# Patient Record
Sex: MALE | Employment: OTHER | URBAN - METROPOLITAN AREA
[De-identification: names, ages, dates, MRNs, and addresses within clinical notes are randomized per-mention and may not be internally consistent; named-entity substitution may affect disease eponyms.]

---

## 2019-02-03 ENCOUNTER — HOSPITAL ENCOUNTER (EMERGENCY)
Facility: HOSPITAL | Age: 64
Discharge: LEFT BEFORE DISCHARGE | End: 2019-02-03
Attending: EMERGENCY MEDICINE
Payer: MEDICARE

## 2019-02-03 VITALS
RESPIRATION RATE: 16 BRPM | DIASTOLIC BLOOD PRESSURE: 65 MMHG | WEIGHT: 164 LBS | HEIGHT: 69 IN | HEART RATE: 84 BPM | OXYGEN SATURATION: 99 % | TEMPERATURE: 99 F | BODY MASS INDEX: 24.29 KG/M2 | SYSTOLIC BLOOD PRESSURE: 131 MMHG

## 2019-02-03 DIAGNOSIS — R56.9 SEIZURE: Primary | ICD-10-CM

## 2019-02-03 PROCEDURE — 99281 EMR DPT VST MAYX REQ PHY/QHP: CPT

## 2019-02-03 NOTE — ED PROVIDER NOTES
"Encounter Date: 2/3/2019    SCRIBE #1 NOTE: I, Winter Molina, am scribing for, and in the presence of,  Dr. Sherwood. I have scribed the entire note.       History     Chief Complaint   Patient presents with    Seizures     denies hx of seizure, [place] by security at airport. states pt "slumped to floor and began to have fish type movement on floor". Pt does not remember episode. Denies trauma.      Robinson Naik is a 63 y.o. male who  has no past medical history on file.     The patient presents to the ED via EMS due to a witnessed seizure like activity at the airport by airport security PTA. Per EMS, airport security reports patient was "slumped to floor and began to have fish type movement on floor." Patient denies any memory of the event. He reports of pain to the back of his head. Has no other complaint at this time.  He reports of no Hx of seizures. The patient denies any EtOH use or drug use. The patient is requesting to leave AMA.      The history is provided by the patient and the EMS personnel.     Review of patient's allergies indicates:  No Known Allergies  No past medical history on file.  No past surgical history on file.  No family history on file.  Social History     Tobacco Use    Smoking status: Not on file   Substance Use Topics    Alcohol use: Not on file    Drug use: Not on file     Review of Systems   Constitutional: Negative for chills and fever.   HENT: Negative for congestion, ear pain, rhinorrhea and sore throat.         (+) pain to back of head   Respiratory: Negative for cough, shortness of breath and wheezing.    Cardiovascular: Negative for chest pain and palpitations.   Gastrointestinal: Negative for abdominal pain, diarrhea, nausea and vomiting.   Genitourinary: Negative for dysuria and hematuria.   Musculoskeletal: Negative for back pain, myalgias and neck pain.   Skin: Negative for rash.   Neurological: Positive for seizures and syncope. Negative for dizziness, weakness, " light-headedness and headaches.   Psychiatric/Behavioral: Negative for confusion.       Physical Exam     Initial Vitals [02/03/19 0415]   BP Pulse Resp Temp SpO2   131/65 84 16 99 °F (37.2 °C) 99 %      MAP       --         Physical Exam    Nursing note and vitals reviewed.  Constitutional: He appears well-developed and well-nourished.   HENT:   Head: Normocephalic and atraumatic.   Eyes: EOM are normal. Pupils are equal, round, and reactive to light.   Neck: Normal range of motion. Neck supple.   Cardiovascular: Normal rate, regular rhythm and normal heart sounds.   Pulmonary/Chest: Breath sounds normal. No respiratory distress.   Abdominal: Soft. Bowel sounds are normal. There is no tenderness.   Musculoskeletal: Normal range of motion. He exhibits no tenderness.   Neurological: He is alert and oriented to person, place, and time. He has normal strength. GCS score is 15. GCS eye subscore is 4. GCS verbal subscore is 5. GCS motor subscore is 6.   No focal neurologic deficits  Steady gait   Skin: Skin is warm and dry. Capillary refill takes less than 2 seconds.         ED Course   Procedures  Labs Reviewed - No data to display       Imaging Results    None          Medical Decision Making:   ED Management:  Pt arrived via ems stating he does not want to be evaluated. Pt has normal gait, no focal neuro deficits and appears to demonstrate appropriate decision making capacity              Attending Attestation:           Physician Attestation for Scribe:  Physician Attestation Statement for Scribe #1: I, willow interiano, reviewed documentation, as scribed by priscilla orozco in my presence, and it is both accurate and complete.                    Clinical Impression:     1. Seizure        Disposition:   Disposition: AMA  Condition: Fair                        Willow Interiano MD  02/03/19 7991